# Patient Record
Sex: MALE | Race: WHITE | Employment: FULL TIME | URBAN - METROPOLITAN AREA
[De-identification: names, ages, dates, MRNs, and addresses within clinical notes are randomized per-mention and may not be internally consistent; named-entity substitution may affect disease eponyms.]

---

## 2018-10-16 ENCOUNTER — HOSPITAL ENCOUNTER (EMERGENCY)
Facility: CLINIC | Age: 23
Discharge: HOME | End: 2018-10-17
Attending: EMERGENCY MEDICINE | Admitting: EMERGENCY MEDICINE
Payer: COMMERCIAL

## 2018-10-16 ENCOUNTER — APPOINTMENT (OUTPATIENT)
Dept: GENERAL RADIOLOGY | Facility: CLINIC | Age: 23
End: 2018-10-16
Attending: EMERGENCY MEDICINE
Payer: COMMERCIAL

## 2018-10-16 DIAGNOSIS — S59.902A ELBOW INJURY, LEFT, INITIAL ENCOUNTER: ICD-10-CM

## 2018-10-16 PROCEDURE — 73080 X-RAY EXAM OF ELBOW: CPT | Mod: LT

## 2018-10-16 PROCEDURE — 99282 EMERGENCY DEPT VISIT SF MDM: CPT | Mod: Z6 | Performed by: EMERGENCY MEDICINE

## 2018-10-16 PROCEDURE — 73030 X-RAY EXAM OF SHOULDER: CPT | Mod: LT

## 2018-10-16 PROCEDURE — 99284 EMERGENCY DEPT VISIT MOD MDM: CPT | Performed by: EMERGENCY MEDICINE

## 2018-10-16 RX ORDER — IBUPROFEN 600 MG/1
600 TABLET, FILM COATED ORAL ONCE
Status: COMPLETED | OUTPATIENT
Start: 2018-10-16 | End: 2018-10-17

## 2018-10-16 ASSESSMENT — ENCOUNTER SYMPTOMS
PHOTOPHOBIA: 0
HEADACHES: 0
SHORTNESS OF BREATH: 0
ABDOMINAL PAIN: 0
NECK STIFFNESS: 0
NAUSEA: 0
JOINT SWELLING: 1
NECK PAIN: 0
BACK PAIN: 0
VOMITING: 0
CHILLS: 0

## 2018-10-16 NOTE — ED AVS SNAPSHOT
[facility], Emergency Department    [address]    [facility] [place] [address]    Phone:  [phone]    Fax:  [phone]                                       Terrance Phillips   MRN: [id number]    Department:  [facility], Emergency Department   Date of Visit:  10/16/2018           Patient Information     Date Of Birth          1995        Your diagnoses for this visit were:     Elbow injury, left, initial encounter        You were seen by Terrance Malloy MD.        Discharge Instructions       Please make an appointment to follow up with Orthopedics (phone: [phone]) in 7 days even if entirely better.  You may have a fracture that we do not see in the x-ray due to swelling and pain.  You can use Tylenol, ice, ibuprofen for your pain.  If Terrance has discomfort from fever or other pain, he can have:  Acetaminophen (Tylenol) every 6 hours as needed. His dose is:    2 regular strength tabs (650 mg)                                     (43.2+ kg/96+ lb)    NOTE: If your acetaminophen (Tylenol) came with a dropper marked with 0.4 and 0.8 ml, call us ([phone]) or check with your doctor about the dose before using it.     AND/OR      Ibuprofen (Advil, Motrin) every 6 hours as needed. His/her dose is:    3 regular strength tabs (600 mg)                                                                         (60-80 kg/132-176 lb)          Discharge References/Attachments     RADIAL HEAD FRACTURE (ENGLISH)      24 Hour Appointment Hotline       To make an appointment at any [facility], call [phone]). If you don't have a family doctor or clinic, we will help you find one. [facility] are conveniently located to serve the needs of you and your family.          ED Discharge Orders     EVA REYNOSO                    Review of your medicines      Notice     You have not been prescribed any medications.            Procedures and tests performed during your visit      "Elbow  XR, G/E 3 views, left    XR Shoulder Left G/E 3 Views      Orders Needing Specimen Collection     None      Pending Results     Date and Time Order Name Status Description    10/16/2018 2325 XR Shoulder Left G/E 3 Views Preliminary     10/16/2018 2312 Elbow  XR, G/E 3 views, left Preliminary             Pending Culture Results     No orders found for last 3 day(s).            Pending Results Instructions     If you had any lab results that were not finalized at the time of your Discharge, you can call the ED Lab Result RN at [phone]. You will be contacted by this team for any positive Lab results or changes in treatment. The nurses are available 7 days a week from 10A to 6:30P.  You can leave a message 24 hours per day and they will return your call.        Thank you for choosing [facility]       Thank you for choosing [facility] for your care. Our goal is always to provide you with excellent care. Hearing back from our patients is one way we can continue to improve our services. Please take a few minutes to complete the written survey that you may receive in the mail after you visit with us. Thank you!        iPawn Information     iPawn lets you send messages to your doctor, view your test results, renew your prescriptions, schedule appointments and more. To sign up, go to [email or web address] . Click on \"Log in\" on the left side of the screen, which will take you to the Welcome page. Then click on \"Sign up Now\" on the right side of the page.     You will be asked to enter the access code listed below, as well as some personal information. Please follow the directions to create your username and password.     Your access code is: [email or web address]  Expires: 1/15/2019 12:52 AM     Your access code will  in 90 days. If you need help or a new code, please call your [facility] clinic or [phone].        Care EveryWhere ID     This is your Care EveryWhere ID. This could be used by other organizations " to access your [facility] medical records  [id number]        Equal Access to Services     JARED CAMILO : Rachael Vásquez, gokul jacobsen, shellie mckinney. So [facility] [phone].    ATENCIÓN: Si habla español, tiene a sun disposición servicios gratuitos de asistencia lingüística. Llame al [phone].    We comply with applicable federal civil rights laws and [place] laws. We do not discriminate on the basis of race, color, national origin, age, disability, sex, sexual orientation, or gender identity.            After Visit Summary       This is your record. Keep this with you and show to your community pharmacist(s) and doctor(s) at your next visit.

## 2018-10-16 NOTE — ED AVS SNAPSHOT
[facility] [place], Emergency Department    [address]    [facility] [address]    Phone:  [phone]    Fax:  [phone]                                       Terrance Phillips   MRN: [id number]    Department:  [facility], Emergency Department   Date of Visit:  10/16/2018           After Visit Summary Signature Page     I have received my discharge instructions, and my questions have been answered. I have discussed any challenges I see with this plan with the nurse or doctor.    ..........................................................................................................................................  Patient/Patient Representative Signature      ..........................................................................................................................................  Patient Representative Print Name and Relationship to Patient    ..................................................               ................................................  Date                                   Time    ..........................................................................................................................................  Reviewed by Signature/Title    ...................................................              ..............................................  Date                                               Time          22EPIC Rev 08/18

## 2018-10-17 VITALS
RESPIRATION RATE: 16 BRPM | OXYGEN SATURATION: 98 % | DIASTOLIC BLOOD PRESSURE: 73 MMHG | HEART RATE: 82 BPM | HEIGHT: 72 IN | BODY MASS INDEX: 26.24 KG/M2 | SYSTOLIC BLOOD PRESSURE: 130 MMHG | TEMPERATURE: 96.7 F | WEIGHT: 193.7 LBS

## 2018-10-17 PROCEDURE — 25000132 ZZH RX MED GY IP 250 OP 250 PS 637: Performed by: EMERGENCY MEDICINE

## 2018-10-17 RX ADMIN — IBUPROFEN 600 MG: 600 TABLET ORAL at 00:28

## 2018-10-17 NOTE — DISCHARGE INSTRUCTIONS
Please make an appointment to follow up with Orthopedics (phone: [phone]) in 7 days even if entirely better.  You may have a fracture that we do not see in the x-ray due to swelling and pain.  You can use Tylenol, ice, ibuprofen for your pain.  If Terrance has discomfort from fever or other pain, he can have:  Acetaminophen (Tylenol) every 6 hours as needed. His dose is:    2 regular strength tabs (650 mg)                                     (43.2+ kg/96+ lb)    NOTE: If your acetaminophen (Tylenol) came with a dropper marked with 0.4 and 0.8 ml, call us ([phone]) or check with your doctor about the dose before using it.     AND/OR      Ibuprofen (Advil, Motrin) every 6 hours as needed. His/her dose is:    3 regular strength tabs (600 mg)                                                                         (60-80 kg/132-176 lb)
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
## 2018-10-18 NOTE — TELEPHONE ENCOUNTER
FUTURE VISIT INFORMATION      FUTURE VISIT INFORMATION:    Date: 10/22/18    Time:     [place] [facility] [place] INFORMATION:    Referring provider:      Referring providers clinic:  ED f/u    Reason for visit/diagnosis  fracture or malalignment of the left elbow - xrays and er notes in Epic - Per pt - [facility] per [facility]     RECORDS REQUESTED FROM:       Clinic name Comments Records Status Imaging Status     internal internal                                   RECORDS STATUS

## 2018-10-22 ENCOUNTER — PRE VISIT (OUTPATIENT)
Dept: ORTHOPEDICS | Facility: CLINIC | Age: 23
End: 2018-10-22

## 2018-10-22 ENCOUNTER — OFFICE VISIT (OUTPATIENT)
Dept: ORTHOPEDICS | Facility: CLINIC | Age: 23
End: 2018-10-22
Payer: COMMERCIAL

## 2018-10-22 ENCOUNTER — RADIANT APPOINTMENT (OUTPATIENT)
Dept: GENERAL RADIOLOGY | Facility: CLINIC | Age: 23
End: 2018-10-22
Payer: COMMERCIAL

## 2018-10-22 VITALS — HEIGHT: 72 IN | BODY MASS INDEX: 26.14 KG/M2 | WEIGHT: 193 LBS

## 2018-10-22 DIAGNOSIS — S59.902A ELBOW INJURY, LEFT, INITIAL ENCOUNTER: Primary | ICD-10-CM

## 2018-10-22 DIAGNOSIS — M25.522 LEFT ELBOW PAIN: Primary | ICD-10-CM

## 2018-10-22 DIAGNOSIS — M25.522 LEFT ELBOW PAIN: ICD-10-CM

## 2018-10-22 NOTE — MR AVS SNAPSHOT
After Visit Summary   10/22/2018    Terrance Phillips    MRN: [id number]           Patient Information     Date Of Birth          1995        Visit Information        Provider Department      10/22/2018 12:00 PM Oneil Morgan MD [facility]         Follow-ups after your visit        Your next 10 appointments already scheduled     Oct 30, 2018  9:30 AM CDT   (Arrive by 9:15 AM)   Return Visit with Antwon Chen MD   [facility] ([facility])    [address] [place], [place] [address] [phone]              Who to contact     Please call your clinic at [phone] to:    Ask questions about your health    Make or cancel appointments    Discuss your medicines    Learn about your test results    Speak to your doctor            Additional Information About Your Visit        MyChart Information     ForceManager is an electronic gateway that provides easy, online access to your medical records. With ForceManager, you can request a clinic appointment, read your test results, renew a prescription or communicate with your care team.     To sign up for Mobile[employer] Softwaret visit the website at [email or web address]   You will be asked to enter the access code listed below, as well as some personal information. Please follow the directions to create your username and password.     Your access code is: [email or web address]  Expires: 1/15/2019 12:52 AM     Your access code will  in 90 days. If you need help or a new code, please contact your [facility] or call [phone] for assistance.        Care EveryWhere ID     This is your Care EveryWhere ID. This could be used by other organizations to access your [facility] medical records  [id number]        Your Vitals Were     Height BMI (Body Mass Index)                6' (1.829 m) 26.18 kg/m2           Blood Pressure from Last 3 Encounters:   10/17/18 130/73    Weight  from Last 3 Encounters:   10/22/18 193 lb (87.5 kg)   10/16/18 193 lb 11.2 oz (87.9 kg)              Today, you had the following     No orders found for display       Primary Care Provider Fax #    Physician No Ref-Primary [phone]       No address on file        Equal Access to Services     JARED SWANFLEX : Hadii aad ku hadasho Soomaali, waaxda luqadaha, qaybta kaalmada adeclaudeda, shellie andrewcristalchristina lawler. So [facility] [phone].    ATENCIÓN: Si habla español, tiene a sun disposición servicios gratuitos de asistencia lingüística. Llame al [phone].    We comply with applicable federal civil rights laws and [place] laws. We do not discriminate on the basis of race, color, national origin, age, disability, sex, sexual orientation, or gender identity.            Thank you!     Thank you for choosing [facility]  for your care. Our goal is always to provide you with excellent care. Hearing back from our patients is one way we can continue to improve our services. Please take a few minutes to complete the written survey that you may receive in the mail after your visit with us. Thank you!             Your Updated Medication List - Protect others around you: Learn how to safely use, store and throw away your medicines at www.disposemymeds.org.      Notice  As of 10/22/2018 12:10 PM    You have not been prescribed any medications.
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
## 2018-10-30 ENCOUNTER — RADIANT APPOINTMENT (OUTPATIENT)
Dept: GENERAL RADIOLOGY | Facility: CLINIC | Age: 23
End: 2018-10-30
Payer: COMMERCIAL

## 2018-10-30 ENCOUNTER — OFFICE VISIT (OUTPATIENT)
Dept: ORTHOPEDICS | Facility: CLINIC | Age: 23
End: 2018-10-30
Payer: COMMERCIAL

## 2018-10-30 VITALS — HEIGHT: 72 IN | WEIGHT: 190 LBS | BODY MASS INDEX: 25.73 KG/M2

## 2018-10-30 DIAGNOSIS — S59.902A ELBOW INJURY, LEFT, INITIAL ENCOUNTER: ICD-10-CM

## 2018-10-30 DIAGNOSIS — S50.02XD CONTUSION OF LEFT ELBOW, SUBSEQUENT ENCOUNTER: Primary | ICD-10-CM

## 2018-10-30 NOTE — Clinical Note
Going to get an MRI on him because his pain location is not typical for injury and is not getting better. - Antwon
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
## 2018-10-30 NOTE — MR AVS SNAPSHOT
After Visit Summary   10/30/2018    Terrance Phillips    MRN: [id number]           Patient Information     Date Of Birth          1995        Visit Information        Provider Department      10/30/2018 9:30 AM Antwon Chen MD [facility]        Today's Diagnoses     Contusion of left elbow, subsequent encounter    -  1       Follow-ups after your visit        Follow-up notes from your care team     Return for after MRI.      Your next 10 appointments already scheduled     Nov 05, 2018  2:30 PM CST   MR ELBOW LEFT W/O CONTRAST with [address] [facility] ([facility])    [address] [place] [id number] [phone]           How do I prepare for my exam? (Food and drink instructions) **If you will be receiving sedation or general anesthesia, please see special notes below.**  How do I prepare for my exam? (Other instructions) Take your medicines as usual, unless your doctor tells you not to. Please remove any body piercings and hair extensions before you arrive. Follow your doctor s orders. If you do not, we may have to postpone your exam. You may or may not receive IV contrast for this exam pending the discretion of the Radiologist.  You do not need to do anything special to prepare. **If you will be receiving sedation or general anesthesia, please see special notes below.**  What should I wear:  The MRI machine uses a strong magnet. Please wear clothes without metal (snaps, zippers). A sweatsuit works well, or we may give you a hospital gown.  How long does the exam take: Most tests take 30 to 60 minutes.  HOWEVER, IF YOUR DOCTOR PRESCRIBES ANESTHESIA please plan on spending four to five hours in the recovery room.  What should I bring: Bring a list of your current medicines to your exam (including vitamins, minerals and over-the-counter drugs). Also bring the results of similar scans you may have had.  Do I need a  : **If you will be receiving sedation or general anesthesia, please see special notes below.**  What should I do after the exam? No Restrictions, You may resume normal activities.  What is this test: MRI (magnetic resonance imaging) uses a strong magnet and radio waves to look inside the body. An MRA (magnetic resonance angiogram) does the same thing, but it lets us look at your blood vessels. A computer turns the radio waves into pictures showing cross sections of the body, much like slices of bread. This helps us see any problems more clearly.  Who should I call with questions: Please call the Imaging Department at your exam site with any questions. Directions, parking instructions, and other information is available on our website, [email or web address].  How do I prepare if I m having sedation or anesthesia? **IMPORTANT** THE INSTRUCTIONS BELOW ARE ONLY FOR THOSE PATIENTS WHO HAVE BEEN TOLD THEY WILL RECEIVE SEDATION OR GENERAL ANESTHESIA DURING THEIR MRI PROCEDURE:  IF YOU WILL RECEIVE SEDATION (take medicine to help you relax during your exam): You must get the medicine from your doctor before you arrive. Bring the medicine to the exam. Do not take it at home. Arrive one hour early. Bring someone who can take you home after the test. Your medicine will make you sleepy. After the exam, you may not drive, take a bus or take a taxi by yourself. No eating 8 hours before your exam. You may have clear liquids up until 4 hours before your exam. (Clear liquids include water, clear tea, black coffee and fruit juice without pulp.)  IF YOU WILL RECEIVE ANESTHESIA (be asleep for your exam): Arrive 1 1/2 hours early. Bring someone who can take you home after the test. You may not drive, take a bus or take a taxi by yourself. No eating 8 hours before your exam. You may have clear liquids up until 4 hours before your exam. (Clear liquids include water, clear tea, black coffee and fruit juice without pulp.) You will  spend four to five hours in the recovery room.            2018  9:30 AM CST   (Arrive by 9:15 AM)   Return Visit with Antwon Chen MD   [facility] ([facility])    [address]  [address] [facility] [address] [phone]              Future tests that were ordered for you today     Open Future Orders        Priority Expected Expires Ordered    MR Elbow Left w/o Contrast Routine  10/30/2019 10/30/2018            Who to contact     Please call your clinic at [phone] to:    Ask questions about your health    Make or cancel appointments    Discuss your medicines    Learn about your test results    Speak to your doctor            Additional Information About Your Visit        MyChart Information     [employer] is an electronic gateway that provides easy, online access to your medical records. With [employer], you can request a clinic appointment, read your test results, renew a prescription or communicate with your care team.     To sign up for [employer] visit the website at [email or web address]   You will be asked to enter the access code listed below, as well as some personal information. Please follow the directions to create your username and password.     Your access code is: [email or web address]  Expires: 1/15/2019 12:52 AM     Your access code will  in 90 days. If you need help or a new code, please contact your [facility] or call [phone] for assistance.        Care EveryWhere ID     This is your Care EveryWhere ID. This could be used by other organizations to access your [facility] medical records  [id number]        Your Vitals Were     Height BMI (Body Mass Index)                6' (1.829 m) 25.77 kg/m2           Blood Pressure from Last 3 Encounters:   10/17/18 130/73    Weight from Last 3 Encounters:   10/30/18 190 lb (86.2 kg)   10/22/18 193 lb (87.5 kg)   10/16/18 193 lb 11.2 oz (87.9 kg)                Primary Care Provider Fax #    Physician No Ref-Primary [phone]       No address on file        Equal Access to Services     JARED CAMILO : Hadii aad ku hadstivenartem Thalia, gokul ginnyaliciaha, edward nuñez klaudialexx, shellie beain hayaakarina rudolphcharline bush lamichaelkarina lawler. So [facility] [phone].    ATENCIÓN: Si habla español, tiene a sun disposición servicios gratuitos de asistencia lingüística. Llame al [phone].    We comply with applicable federal civil rights laws and [place] laws. We do not discriminate on the basis of race, color, national origin, age, disability, sex, sexual orientation, or gender identity.            Thank you!     Thank you for choosing [facility]  for your care. Our goal is always to provide you with excellent care. Hearing back from our patients is one way we can continue to improve our services. Please take a few minutes to complete the written survey that you may receive in the mail after your visit with us. Thank you!             Your Updated Medication List - Protect others around you: Learn how to safely use, store and throw away your medicines at www.disposemymeds.org.      Notice  As of 10/30/2018  9:54 AM    You have not been prescribed any medications.

## 2018-11-05 ENCOUNTER — RADIANT APPOINTMENT (OUTPATIENT)
Dept: MRI IMAGING | Facility: CLINIC | Age: 23
End: 2018-11-05
Attending: FAMILY MEDICINE
Payer: COMMERCIAL

## 2018-11-05 DIAGNOSIS — S50.02XD CONTUSION OF LEFT ELBOW, SUBSEQUENT ENCOUNTER: ICD-10-CM

## 2018-11-05 LAB — RADIOLOGIST FLAGS: NORMAL

## 2018-11-06 ENCOUNTER — OFFICE VISIT (OUTPATIENT)
Dept: ORTHOPEDICS | Facility: CLINIC | Age: 23
End: 2018-11-06
Payer: COMMERCIAL

## 2018-11-06 DIAGNOSIS — S52.125D CLOSED NONDISPLACED FRACTURE OF HEAD OF LEFT RADIUS WITH ROUTINE HEALING, SUBSEQUENT ENCOUNTER: Primary | ICD-10-CM

## 2018-11-06 NOTE — PROGRESS NOTES
Pt is a 23 year old male last seen on 10/30/18 here for follow up of:     L elbow pain  Has noted significant improvement over the past week  Still w/ some bruising medially  Has been wearing sling     Past Medical History:   Diagnosis Date     ADHD      Anxiety       No current outpatient prescriptions on file.      No Known Allergies   ROS:   Gen- no fevers/chills   Rheum - no morning stiffness   Derm - no rash/ redness   Neuro - no numbness, no tingling   Remainder of ROS negative.     Exam:     L elbow:  Lacks 10 degrees of extension  +ecchymosis medially  Today he has mild pain w/ deep palpation of radial head  +TTP at medial epicondyle as well      Imaging:  MRI elbow - 11/5/18    CLINICAL HISTORY: Contusion of the left elbow.     TECHNIQUE: Multiplanar, multisequence MR imaging of the left elbow was  obtained using standard sequences in 3 orthogonal planes without the  use of intravenous or intra-articular gadolinium contrast.     FINDINGS:      Small amount of fluid in the left elbow joint.     Extensive bone marrow edema in the capitellum as well as in the  proximal radius with a nondisplaced fracture involving the radial head  and neck junction. No displacement is noted. No definite fracture line  within the capitellum.     The distal biceps and distal brachialis tendons are intact. The distal  triceps tendon is unremarkable.     The ulnar nerve is unremarkable.     The common flexor and common extensor tendons are intact. The ulnar  collateral ligament is unremarkable. The radial collateral ligament is  intact. The lateral ulnar collateral ligament is not well seen.         IMPRESSION:  1. Extensive bone marrow edema in the left proximal radius as well as  in the capitellum, with a nondisplaced fracture involving the left  elbow radial head and neck junction.  2. The tendinous and ligamentous structures about the left elbow are  Intact.      (S52.921D) Closed nondisplaced fracture of head of left radius  with routine healing, subsequent encounter  (primary encounter diagnosis)  Comment: reviewed exam and imaging at length; still unclear why he had medial pain/ bruising but his radial head fx is stable; will wean out of sling and start PT; anticipatory guidance given; f/u prn  Plan: PHYSICAL THERAPY REFERRAL (Internal)            Antwon Chen MD  November 6, 2018  9:32 AM

## 2018-11-06 NOTE — MR AVS SNAPSHOT
After Visit Summary   2018    Terrance Phillips    MRN: [id number]           Patient Information     Date Of Birth          1995        Visit Information        Provider Department      2018 9:30 AM Antwon Chen MD [facility]        Today's Diagnoses     Closed nondisplaced fracture of head of left radius with routine healing, subsequent encounter    -  1       Follow-ups after your visit        Additional Services     PHYSICAL THERAPY REFERRAL (Internal)       Physical Therapy Referral                  Follow-up notes from your care team     Return in about 6 weeks (around 2018).      Future tests that were ordered for you today     Open Future Orders        Priority Expected Expires Ordered    PHYSICAL THERAPY REFERRAL (Internal) Routine  2019            Who to contact     Please call your clinic at [phone] to:    Ask questions about your health    Make or cancel appointments    Discuss your medicines    Learn about your test results    Speak to your doctor            Additional Information About Your Visit        MyChart Information     [employer] is an electronic gateway that provides easy, online access to your medical records. With [employer], you can request a clinic appointment, read your test results, renew a prescription or communicate with your care team.     To sign up for [employer] visit the website at [email or web address]   You will be asked to enter the access code listed below, as well as some personal information. Please follow the directions to create your username and password.     Your access code is: [email or web address]  Expires: 2019 11:52 PM     Your access code will  in 90 days. If you need help or a new code, please contact your [facility] or call [phone] for assistance.        Care EveryWhere ID     This is your Care EveryWhere ID. This could be used by other organizations to access  your [facility] medical records  [id number]         Blood Pressure from Last 3 Encounters:   10/17/18 130/73    Weight from Last 3 Encounters:   10/30/18 190 lb (86.2 kg)   10/22/18 193 lb (87.5 kg)   10/16/18 193 lb 11.2 oz (87.9 kg)               Primary Care Provider Fax #    Physician No Ref-Primary [phone]       No address on file        Equal Access to Services     JARED CAMILO : Hadii aad ku hadasho Soomaali, waaxda luqadaha, qaybta kaalmada adeegyada, waxay idiin hayaan teri andrewcristalchristina mortensen . So [facility] [phone].    ATENCIÓN: Si habla español, tiene a sun disposición servicios gratuitos de asistencia lingüística. Llame al [phone].    We comply with applicable federal civil rights laws and [place] laws. We do not discriminate on the basis of race, color, national origin, age, disability, sex, sexual orientation, or gender identity.            Thank you!     Thank you for choosing [facility]  for your care. Our goal is always to provide you with excellent care. Hearing back from our patients is one way we can continue to improve our services. Please take a few minutes to complete the written survey that you may receive in the mail after your visit with us. Thank you!             Your Updated Medication List - Protect others around you: Learn how to safely use, store and throw away your medicines at www.disposemymeds.org.      Notice  As of 11/6/2018  9:36 AM    You have not been prescribed any medications.

## 2018-11-06 NOTE — LETTER
11/6/2018      RE: Terrance Phillips  [address] [address]       HPI:         Pt is a 23 year old male last seen on 10/30/18 here for follow up of:     L elbow pain  Has noted significant improvement over the past week  Still w/ some bruising medially  Has been wearing sling     Past Medical History:   Diagnosis Date     ADHD      Anxiety       No current outpatient prescriptions on file.      No Known Allergies   ROS:   Gen- no fevers/chills   Rheum - no morning stiffness   Derm - no rash/ redness   Neuro - no numbness, no tingling   Remainder of ROS negative.     Exam:     L elbow:  Lacks 10 degrees of extension  +ecchymosis medially  Today he has mild pain w/ deep palpation of radial head  +TTP at medial epicondyle as well      Imaging:  MRI elbow - 11/5/18    CLINICAL HISTORY: Contusion of the left elbow.     TECHNIQUE: Multiplanar, multisequence MR imaging of the left elbow was  obtained using standard sequences in 3 orthogonal planes without the  use of intravenous or intra-articular gadolinium contrast.     FINDINGS:      Small amount of fluid in the left elbow joint.     Extensive bone marrow edema in the capitellum as well as in the  proximal radius with a nondisplaced fracture involving the radial head  and neck junction. No displacement is noted. No definite fracture line  within the capitellum.     The distal biceps and distal brachialis tendons are intact. The distal  triceps tendon is unremarkable.     The ulnar nerve is unremarkable.     The common flexor and common extensor tendons are intact. The ulnar  collateral ligament is unremarkable. The radial collateral ligament is  intact. The lateral ulnar collateral ligament is not well seen.         IMPRESSION:  1. Extensive bone marrow edema in the left proximal radius as well as  in the capitellum, with a nondisplaced fracture involving the left  elbow radial head and neck junction.  2. The tendinous and ligamentous  structures about the left elbow are  Intact.      (S57.050B) Closed nondisplaced fracture of head of left radius with routine healing, subsequent encounter  (primary encounter diagnosis)  Comment: reviewed exam and imaging at length; still unclear why he had medial pain/ bruising but his radial head fx is stable; will wean out of sling and start PT; anticipatory guidance given; f/u prn  Plan: PHYSICAL THERAPY REFERRAL (Internal)            Antwon Chen MD  November 6, 2018  9:32 AM

## 2020-03-11 ENCOUNTER — HEALTH MAINTENANCE LETTER (OUTPATIENT)
Age: 25
End: 2020-03-11

## 2020-05-06 ENCOUNTER — VIRTUAL VISIT (OUTPATIENT)
Dept: FAMILY MEDICINE | Facility: OTHER | Age: 25
End: 2020-05-06

## 2020-05-06 NOTE — PROGRESS NOTES
"Date: 2020 17:21:37  Clinician: David Nelson  Clinician NPI: [id number]  Patient: Terrance Phillips  Patient : 1995  Patient Address: [address] [place] [address]  Patient Phone: [phone]  Visit Protocol: URI  Patient Summary:  Terrance is a 24 year old ( : 1995 ) male who initiated a Visit for cold, sinus infection, or influenza. When asked the question \"Please sign me up to receive news, health information and promotions. \", Terrance responded \"No\".    Terrance states his symptoms started 1-2 days ago.   His symptoms consist of myalgia, a sore throat, a cough, nasal congestion, diarrhea, malaise, and chills. He is experiencing difficulty breathing due to nasal congestion but he is not short of breath. Terrance also feels feverish.   Symptom details     Nasal secretions: The color of his mucus is yellow and clear.    Cough: Terrance coughs a few times an hour and his cough is more bothersome at night. Phlegm comes into his throat when he coughs. He believes his cough is caused by post-nasal drip. The color of the phlegm is clear.     Sore throat: Terrance reports having mild throat pain (1-3 on a 10 point pain scale), does not have exudate on his tonsils, and can swallow liquids. The lymph nodes in his neck are not enlarged. A rash has not appeared on the skin since the sore throat started.     Temperature: His current temperature is 97.5 degrees Fahrenheit.      Terrance denies having rhinitis, facial pain or pressure, vomiting, nausea, teeth pain, ageusia, anosmia, ear pain, headache, wheezing, and enlarged lymph nodes. He also denies taking antibiotic medication for the symptoms, having a sinus infection within the past year, and having recent facial or sinus surgery in the past 60 days.   Precipitating events  Within the past week, Terrance has not been exposed to someone with strep throat. He has not recently been exposed to someone with influenza. Terrance has not been in close " contact with any high risk individuals.   Pertinent COVID-19 (Coronavirus) information  Terrance does not work or volunteer as healthcare worker or a  and does not work or volunteer in a healthcare facility.   He does not live with a healthcare worker.   Terrance has not had a close contact with a laboratory-confirmed COVID-19 patient within 14 days of symptom onset. He also has not had a close contact with a suspected COVID-19 patient within 14 days of symptom onset.   Pertinent medical history  Terrance does not need a return to work/school note.   Weight: 190 lbs   Terrance smokes or uses smokeless tobacco.   Weight: 190 lbs    MEDICATIONS: Lexapro oral, ALLERGIES: NKDA  Clinician Response:  Dear Terrance,  Based on the information provided, you have a viral upper respiratory infection, otherwise known as a cold. Symptoms vary from person to person, but can include sneezing, coughing, a runny nose, sore throat, and headache and range from mild to severe.  Unfortunately, there are no medications that can cure a cold, so treatment is focused on controlling symptoms as much as possible. Most people gradually feel better until symptoms are gone in 1-2 weeks.  Medication information  Because you have a viral infection, antibiotics will not help you get better. Treating a viral infection with antibiotics could actually make you feel worse.  I am prescribing:     Fluticasone 50 mcg/actuation nasal spray. Inhale 2 sprays in each nostril 1 time per day; after 1 week, may adjust to 1 - 2 sprays in each nostril 1 time per day. This medication takes several days to start working, so keep taking it even if it doesn't help right away. There are no refills with this prescription.   Self care  Steps you can take to be as comfortable as possible:     Rest.    Drink plenty of fluids.    Take a warm shower to loosen congestion    Use a cool-mist humidifier.    Use throat lozenges.    Suck on frozen items such as  popsicles.    Drink hot tea with lemon and honey.    Gargle with warm salt water (1/4 teaspoon of salt per 8 ounce glass of water).    Take a spoonful of honey to reduce your cough.     Also, as your provider, I need you to know that becoming tobacco-free is the most important thing you can do to protect your current and future health.  When to seek care  Please be seen in a clinic or urgent care if any of the following occur:   New symptoms develop, or symptoms become worse   Call ahead before going to the clinic or urgent care.  Call 911 or go to the emergency room if you feel that your throat is closing off, you suddenly develop a rash, you are unable to swallow fluids, you are drooling, or you are having difficulty breathing.  Additional treatment plan   Dear Terrance  Your symptoms show that you may have coronavirus (COVID-19). This illness can cause fever, cough and trouble breathing. Many people get a mild case and get better on their own. Some people can get very sick.  Will I be tested for COVID-19?  Because we have limited testing supplies we are not testing everyone if they are low risk. We are testing if:   You are very ill. For example, you're on chemotherapy, dialysis or home hospice care. (Contact your specialty clinic or program.)   You live in a nursing home or other long-term care facility. (Talk to your nurse manager or medical director.)   You're a health care worker. ([facility] employees Contact our employee health office for testing.)   We are performing limited curbside testing for healthcare/first responders and people with medical problems that put them at increased risk. It does not appear by the [facility] information you submitted that you meet any of these criteria. If there are medical problems that we did not know about, please repeat an [facility] visit and let us know what medical conditions you have.   How can I protect others?  Without a test, we can't know for sure that you have  COVID-19. For safety, it's very important to follow these rules.  First, stay home and away from others (self-isolate) until:   You've had no fever---and no medicine that reduces fever---for 3 full days (72 hours). And...    Your other symptoms have gotten better. For example, your cough or breathing has improved. And...   At least 10 days have passed since your symptoms started.   During this time:   Don't go to work, school or anywhere else.    Stay away from others in your home. No hugging, kissing or shaking hands.   Don't let anyone visit.   Cover your mouth and nose with a mask, tissue or wash cloth to avoid spreading germs.   Wash your hands and face often. Use soap and water.   How can I take care of myself?  1.Take Tylenol (acetaminophen) for fever or pain. If you have liver or kidney problems, ask your family doctor if it's okay to take Tylenol.   Adults can take either:    650 mg (two 325 mg pills) every 4 to 6 hours, or...   1,000 mg (two 500 mg pills) every 8 hours as needed.    Note: Don't take more than 3,000 mg in one day.  For children, check the Tylenol bottle for the right dose. The dose is based on the child's age or weight.   2.If you have other health problems (like cancer, heart failure, an organ transplant or severe kidney disease): Call your specialty clinic if you don't feel better in the next 2 days.  3.Know when to call 911: If your breathing is so bad that it keeps you from doing normal activities, call 911 or go to the emergency room. Tell them that you've been staying home and may have COVID-19.  4.Sign up for [employer]. We know it's scary to hear that you might have COVID-19. We want to track your symptoms to make sure you're okay over the next 2 weeks. Please look for an email from [employer]---this is a free, online program that we'll use to keep in touch. To sign up, follow the link in the email. Learn more at [email or web address].  Where can I get more  information?  To learn more about COVID-19 and how to care for yourself at home, please visit the CDC website at https://www.cdc.gov/coronavirus/2019-ncov/about/steps-when-sick.html.  For more options for care at [facility], please visit our website at [email or web address]   If you are interested in becoming part of a [facility] clinic trial related to COVID19 please go to [email or web address] for information, if you qualify.     Diagnosis: Cough  Diagnosis ICD: R05  Prescription: fluticasone 50 mcg/actuation nasal spray,suspension 1 120 spray aerosol with adapter (grams), 30 days supply. Inhale 2 sprays in each nostril 1 time per day; after 1 week, may adjust to 1 - 2 sprays in each nostril 1 time per day.. Refills: 0, Refill as needed: no, Allow substitutions: yes

## 2021-01-03 ENCOUNTER — HEALTH MAINTENANCE LETTER (OUTPATIENT)
Age: 26
End: 2021-01-03

## 2021-04-25 ENCOUNTER — HEALTH MAINTENANCE LETTER (OUTPATIENT)
Age: 26
End: 2021-04-25

## 2021-10-10 ENCOUNTER — HEALTH MAINTENANCE LETTER (OUTPATIENT)
Age: 26
End: 2021-10-10

## 2022-05-21 ENCOUNTER — HEALTH MAINTENANCE LETTER (OUTPATIENT)
Age: 27
End: 2022-05-21

## 2022-06-13 ENCOUNTER — NURSE TRIAGE (OUTPATIENT)
Dept: NURSING | Facility: CLINIC | Age: 27
End: 2022-06-13

## 2022-06-13 NOTE — TELEPHONE ENCOUNTER
Patient sprained ankle last Sunday.  Went to Urgent Care and xray was done and there was no fracture. Patient was given a boot to wear and told to ice.  Patient has been doing that but his foot and ankle are getting worse. Foot is swelling and has now gone up to the knee.  Patient is having discoloration throughout his foot, ankle and toes.  He states the pain is also increasing.    Patient was told it should get progressivly better and it has not and is getting worse.  His lower leg is much larger than the other lower lower leg.    Patient wanted to know where the best place to go.  We dicussed being seen in the next 24 hours.  He states he would like to go tonight.  I explained the best location would be to a ortho urgent care.  He states he has a [facility] by him. He will plan to go there.    Daisha Soliman RN   06/13/22 5:01 PM  [facility] Nurse Advisor  Reason for Disposition    Large swelling or bruise (> 2 inches or 5 cm)    Additional Information    Negative: Serious injury with multiple fractures (broken bones)    Negative: [1] Major bleeding (e.g., actively dripping or spurting) AND [2] can't be stopped    Negative: Amputation    Negative: Looks like a dislocated joint (very crooked or deformed)    Negative: Sounds like a life-threatening emergency to the triager    Negative: Wound looks infected    Negative: Caused by an animal bite    Negative: Caused by a human bite    Negative: Puncture wound of foot    Negative: Toe injury is main concern    Negative: Cast problems or questions    Negative: Bullet wound, stabbed by knife, or other serious penetrating wound    Negative: Skin is split open or gaping (or length > 1/2 inch or 12 mm)    Negative: [1] Bleeding AND [2] won't stop after 10 minutes of direct pressure (using correct technique)    Negative: [1] Dirt in the wound AND [2] not removed with 15 minutes of scrubbing    Negative: Can't stand (bear weight) or walk    Negative: [1] Numbness (new loss  of sensation) of toe(s) AND [2] present now    Negative: Sounds like a serious injury to the triager    Negative: [1] SEVERE pain AND [2] not improved 2 hours after pain medicine/ice packs    Negative: Suspicious history for the injury    Negative: [1] Limp when walking AND [2] due to a twisted ankle or foot    Negative: [1] Limp when walking AND [2] due to a direct blow or crushing injury    Protocols used: ANKLE AND FOOT INJURY-A-AH

## 2022-09-18 ENCOUNTER — HEALTH MAINTENANCE LETTER (OUTPATIENT)
Age: 27
End: 2022-09-18

## 2023-06-04 ENCOUNTER — HEALTH MAINTENANCE LETTER (OUTPATIENT)
Age: 28
End: 2023-06-04

## 2023-08-27 NOTE — LETTER
Date:October 23, 2018      Patient was self referred, no letter generated. Do not send.        [facility] Information       IV discontinued, cath removed intact